# Patient Record
Sex: FEMALE | Race: WHITE | NOT HISPANIC OR LATINO | Employment: UNEMPLOYED | URBAN - METROPOLITAN AREA
[De-identification: names, ages, dates, MRNs, and addresses within clinical notes are randomized per-mention and may not be internally consistent; named-entity substitution may affect disease eponyms.]

---

## 2022-10-25 ENCOUNTER — HOSPITAL ENCOUNTER (EMERGENCY)
Facility: CLINIC | Age: 22
Discharge: HOME | End: 2022-10-25
Attending: EMERGENCY MEDICINE | Admitting: EMERGENCY MEDICINE

## 2022-10-25 VITALS
OXYGEN SATURATION: 96 % | HEART RATE: 105 BPM | SYSTOLIC BLOOD PRESSURE: 127 MMHG | HEIGHT: 61 IN | BODY MASS INDEX: 25.49 KG/M2 | RESPIRATION RATE: 16 BRPM | WEIGHT: 135 LBS | TEMPERATURE: 97.2 F | DIASTOLIC BLOOD PRESSURE: 92 MMHG

## 2022-10-25 DIAGNOSIS — F10.920 ALCOHOLIC INTOXICATION WITHOUT COMPLICATION (H): ICD-10-CM

## 2022-10-25 PROCEDURE — 99283 EMERGENCY DEPT VISIT LOW MDM: CPT

## 2022-10-25 ASSESSMENT — ACTIVITIES OF DAILY LIVING (ADL)
ADLS_ACUITY_SCORE: 35
ADLS_ACUITY_SCORE: 35

## 2022-10-25 NOTE — ED PROVIDER NOTES
"  History   Chief Complaint:  Alcohol Intoxication       HPI   Jasmina Mcintosh is a 22 year old female who presents with EMS for evaluation of alcohol intoxication. The patient reports that she is flying from [place] to [place] to visit her grandmother and had a layover in [place]. She was on the airplane today when she vomited and was unable to walk. EMS was called and she was brought to the ED. She endorses drinking tequila, vodka and wine. She notes she recently got out of a long relationship. She denies an SI.    Review of Systems   Psychiatric/Behavioral: Negative for suicidal ideas.   All other systems reviewed and are negative.    Allergies:  The patient has no known allergies.     Medications:  Wellbutrin    Past Medical History:     GERD  Anxiety   Depression     Social History:  The patient presents to the ED alone  She is from [place]     Physical Exam     Patient Vitals for the past 24 hrs:   BP Temp Temp src Pulse Resp SpO2 Height Weight   10/25/22 0709 -- 97.2  F (36.2  C) Temporal -- -- -- -- --   10/25/22 0706 (!) 127/92 -- -- 105 16 96 % 1.549 m (5' 1\") 61.2 kg (135 lb)       Physical Exam  GENERAL: well developed, pleasant  HEAD: atraumatic  EYES: pupils reactive, extraocular muscles intact, conjunctivae normal  ENT:  mucus membranes moist  NECK:  trachea midline, normal range of motion  RESPIRATORY: no tachypnea, breath sounds clear to auscultation   CVS: normal S1/S2, no murmurs, intact distal pulses  ABDOMEN: soft, nontender, nondistention  MUSCULOSKELETAL: no deformities  SKIN: warm and dry, no acute rashes or ulceration  NEURO: Slurred speech. Able to ambulate with occasional stagger. Appears intoxicated. GCS 15, cranial nerves intact, alert and oriented x3  PSYCH:  Mood/affect normal      Emergency Department Course   Emergency Department Course:           Reviewed:  I reviewed nursing notes, vitals, past medical history and Care Everywhere    Assessments:  0735 I obtained history and " examined the patient as noted above. Plan explained at this time.    Interventions:  None     Disposition:  The patient was discharged to home.     Impression & Plan   Medical Decision Making:    Patient presents from airport with intoxication.  She denies feeling suicidal.  She was watched here is able to ambulate back and forth to the bathroom.  She is been on the phone for much her entire time in the ED talking to friends/boyfriend.  She notes she typically does not drink but had at least 3 drinks in the airport.  Patient retransferred back to the airport with plans of continuing her trip to [place] to see family..        Diagnosis:    ICD-10-CM    1. Alcoholic intoxication without complication (H)  F10.920           Discharge Medications:  New Prescriptions    No medications on file       Scribe Disclosure:  Jericho COLÓN, am serving as a scribe at 7:27 AM on 10/25/2022 to document services personally performed by Hardeep Pal MD based on my observations and the provider's statements to me.            Hardeep Pal MD  10/25/22 0930

## 2022-10-25 NOTE — ED TRIAGE NOTES
Here from airport by EMS. Vomited on plane. ETOH use. Wasn't able to ambulate on own.      Triage Assessment     Row Name 10/25/22 8852       Triage Assessment (Adult)    Airway WDL WDL       Respiratory WDL    Respiratory WDL WDL       Skin Circulation/Temperature WDL    Skin Circulation/Temperature WDL WDL       Cardiac WDL    Cardiac WDL WDL       Peripheral/Neurovascular WDL    Peripheral Neurovascular WDL WDL       Cognitive/Neuro/Behavioral WDL    Cognitive/Neuro/Behavioral WDL X  etoh       Jaleesa Coma Scale    Best Eye Response 4-->(E4) spontaneous    Best Motor Response 6-->(M6) obeys commands    Best Verbal Response 4-->(V4) confused    Morrison Coma Scale Score 14